# Patient Record
Sex: MALE | Race: WHITE | NOT HISPANIC OR LATINO | Employment: OTHER | URBAN - METROPOLITAN AREA
[De-identification: names, ages, dates, MRNs, and addresses within clinical notes are randomized per-mention and may not be internally consistent; named-entity substitution may affect disease eponyms.]

---

## 2021-07-22 ENCOUNTER — OFFICE VISIT (OUTPATIENT)
Dept: INTERNAL MEDICINE | Facility: CLINIC | Age: 26
End: 2021-07-22

## 2021-07-22 VITALS
HEIGHT: 68 IN | WEIGHT: 159 LBS | HEART RATE: 55 BPM | TEMPERATURE: 97.7 F | DIASTOLIC BLOOD PRESSURE: 70 MMHG | SYSTOLIC BLOOD PRESSURE: 115 MMHG | BODY MASS INDEX: 24.1 KG/M2

## 2021-07-22 DIAGNOSIS — Z11.59 NEED FOR HEPATITIS C SCREENING TEST: ICD-10-CM

## 2021-07-22 DIAGNOSIS — IMO0002: ICD-10-CM

## 2021-07-22 DIAGNOSIS — K50.90 CROHN'S DISEASE WITHOUT COMPLICATION, UNSPECIFIED GASTROINTESTINAL TRACT LOCATION (HCC): Primary | ICD-10-CM

## 2021-07-22 DIAGNOSIS — Z13.220 LIPID SCREENING: ICD-10-CM

## 2021-07-22 PROCEDURE — 99203 OFFICE O/P NEW LOW 30 MIN: CPT | Performed by: STUDENT IN AN ORGANIZED HEALTH CARE EDUCATION/TRAINING PROGRAM

## 2021-07-22 RX ORDER — AZATHIOPRINE 50 MG/1
150 TABLET ORAL 3 TIMES DAILY
COMMUNITY
End: 2021-07-27 | Stop reason: SDUPTHER

## 2021-07-22 NOTE — PROGRESS NOTES
"Chief Complaint  Rigoberto Sanches is a 25 y.o. male presenting for Inflammatory Bowel Disease.     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 Worked at [employer] in the past. Has bachelor in design. Moved from [place], [place]  to [place], [place] February 2021. Single, no children. Lives by himself.    Patient has a past medical history of uncomplicated Crohn's disease since 9 years of age, was on steroids in the past, currently stable on azathioprine for years.    History of Present Illness  Patient is here to establish care after relocating from [place] to [place] in February.    He has a history of Crohn's disease since 9 years of age, in the beginning he was on steroids.  For the last at least more than 5 years he has been on azathioprine 150 mg 3 times daily without any complications.  He never had any surgeries for his IBD, no severe infections, no fistulas.  Currently patient is having normal bowel movements 1-2 times daily, no diarrhea, no blood or mucus.  He is having no abdominal pain or cramping.  No nausea.  He is compliant on his medication.    Patient states he is also been exposed to acrylamide and would like evaluation with occupational medicine.  He is self-employed.    He is otherwise healthy, no smoking, no drug use.  He exercises regularly.    The following portions of the patient's history were reviewed and updated as appropriate: allergies, current medications, past family history, past medical history, past social history, past surgical history and problem list.    Objective  /70 (BP Location: Left arm, Patient Position: Sitting, Cuff Size: Adult)   Pulse 55   Temp 97.7 °F (36.5 °C) (Temporal)   Ht 172.7 cm (68\")   Wt 72.1 kg (159 lb)   BMI 24.18 kg/m²     Physical Exam  Vitals reviewed.   Constitutional:       Appearance: Normal appearance.   HENT:      Head: Normocephalic and atraumatic.      Right Ear: Ear canal and external ear normal. There is impacted cerumen.      Left Ear: Ear " ----- Message from MOR Pickard sent at 4/16/2018  4:24 PM EDT -----  Please forward to pt's PCP. Thank you!  Vy   canal and external ear normal. There is impacted cerumen.      Nose: Nose normal. No congestion.      Mouth/Throat:      Mouth: Mucous membranes are moist.      Pharynx: Oropharynx is clear.   Eyes:      Extraocular Movements: Extraocular movements intact.      Conjunctiva/sclera: Conjunctivae normal.   Cardiovascular:      Rate and Rhythm: Normal rate and regular rhythm.      Heart sounds: Normal heart sounds. No murmur heard.     Pulmonary:      Effort: Pulmonary effort is normal.      Breath sounds: Normal breath sounds.   Abdominal:      General: There is no distension.      Palpations: Abdomen is soft. There is no mass.      Tenderness: There is no abdominal tenderness.   Musculoskeletal:      Cervical back: Neck supple.      Right lower leg: No edema.      Left lower leg: No edema.   Skin:     General: Skin is warm and dry.   Neurological:      Mental Status: He is alert and oriented to person, place, and time. Mental status is at baseline.   Psychiatric:         Behavior: Behavior normal.         Thought Content: Thought content normal.         Assessment/Plan   1. Crohn's disease without complication, unspecified gastrointestinal tract location (CMS/HCC)  Appears stable, uncomplicated and well controlled on azathioprine.  Continue on current medication.  Will refer to GI Dr. Barnes per patient request.  We will do labs as ordered.  - Ambulatory Referral to Gastroenterology  - C-reactive Protein; Future  - Comprehensive Metabolic Panel; Future  - CBC (No Diff); Future  - Sedimentation Rate; Future  - Vitamin B12; Future  - Vitamin D 25 Hydroxy; Future    2. Occupational exposure to toxic agent  I am not sure if occupational medicine would be able to provide him any guidance as far as his potential harmful exposure of acrylamide.  However will place an order to see if they can be of assistance.  - Ambulatory Referral to Occupational Medicine    3. Need for hepatitis C screening test  We will screen as  recommended.  - Hepatitis C Antibody; Future    4. Lipid screening  Patient will return for fasting lipids.  - Lipid Panel; Future      Return in about 3 months (around 10/22/2021) for Annual physical.    Vinicius Brody MD  Family Medicine  07/22/2021    Note: Speech recognition transcription software may have been used to create portions of this document.  An attempt at proofreading has been made but errors in transcription could still be present.

## 2021-07-27 DIAGNOSIS — K50.90 CROHN'S DISEASE WITHOUT COMPLICATION, UNSPECIFIED GASTROINTESTINAL TRACT LOCATION (HCC): Primary | ICD-10-CM

## 2021-07-27 RX ORDER — AZATHIOPRINE 50 MG/1
50 TABLET ORAL 3 TIMES DAILY
Qty: 90 TABLET | Refills: 2 | Status: SHIPPED | OUTPATIENT
Start: 2021-07-27 | End: 2021-10-25 | Stop reason: SDUPTHER

## 2021-07-27 RX ORDER — AZATHIOPRINE 50 MG/1
150 TABLET ORAL 3 TIMES DAILY
Qty: 90 TABLET | Refills: 2 | Status: SHIPPED | OUTPATIENT
Start: 2021-07-27 | End: 2021-07-27

## 2021-07-27 NOTE — PROGRESS NOTES
Patient could not get into see GI until October.  He is about to run out of his azathioprine that he has been on for years and he has been stable and currently is not having any disease activity.  I will refill the medication for 3 months until GI sees him.    Of note his medication was incorrectly entered into our system as 150 mg 3 times daily, I have called patient and confirmed the dose he has been on and been taking daily 50 mg 3 times daily.    1. Crohn's disease without complication, unspecified gastrointestinal tract location (CMS/HCC)  - azaTHIOprine (IMURAN) 50 MG tablet; Take 1 tablet by mouth 3 (Three) Times a Day.  Dispense: 90 tablet; Refill: 2    Vinicius Brody MD

## 2021-10-21 ENCOUNTER — LAB (OUTPATIENT)
Dept: LAB | Facility: HOSPITAL | Age: 26
End: 2021-10-21

## 2021-10-21 DIAGNOSIS — Z13.220 LIPID SCREENING: ICD-10-CM

## 2021-10-21 DIAGNOSIS — Z11.59 NEED FOR HEPATITIS C SCREENING TEST: ICD-10-CM

## 2021-10-21 DIAGNOSIS — K50.90 CROHN'S DISEASE WITHOUT COMPLICATION, UNSPECIFIED GASTROINTESTINAL TRACT LOCATION (HCC): ICD-10-CM

## 2021-10-21 LAB
25(OH)D3 SERPL-MCNC: 30.2 NG/ML (ref 30–100)
DEPRECATED RDW RBC AUTO: 50.5 FL (ref 37–54)
ERYTHROCYTE [DISTWIDTH] IN BLOOD BY AUTOMATED COUNT: 14.2 % (ref 12.3–15.4)
HCT VFR BLD AUTO: 39.5 % (ref 37.5–51)
HCV AB SER DONR QL: NORMAL
HGB BLD-MCNC: 13.7 G/DL (ref 13–17.7)
MCH RBC QN AUTO: 33.7 PG (ref 26.6–33)
MCHC RBC AUTO-ENTMCNC: 34.7 G/DL (ref 31.5–35.7)
MCV RBC AUTO: 97.1 FL (ref 79–97)
PLATELET # BLD AUTO: 245 10*3/MM3 (ref 140–450)
PMV BLD AUTO: 10.3 FL (ref 6–12)
RBC # BLD AUTO: 4.07 10*6/MM3 (ref 4.14–5.8)
VIT B12 BLD-MCNC: 417 PG/ML (ref 211–946)
WBC # BLD AUTO: 5.96 10*3/MM3 (ref 3.4–10.8)

## 2021-10-21 PROCEDURE — 80061 LIPID PANEL: CPT

## 2021-10-21 PROCEDURE — 86803 HEPATITIS C AB TEST: CPT

## 2021-10-21 PROCEDURE — 86140 C-REACTIVE PROTEIN: CPT

## 2021-10-21 PROCEDURE — 85027 COMPLETE CBC AUTOMATED: CPT

## 2021-10-21 PROCEDURE — 80053 COMPREHEN METABOLIC PANEL: CPT

## 2021-10-21 PROCEDURE — 82607 VITAMIN B-12: CPT

## 2021-10-21 PROCEDURE — 82306 VITAMIN D 25 HYDROXY: CPT

## 2021-10-21 PROCEDURE — 85652 RBC SED RATE AUTOMATED: CPT

## 2021-10-22 LAB
ALBUMIN SERPL-MCNC: 5.1 G/DL (ref 3.5–5.2)
ALBUMIN/GLOB SERPL: 2.2 G/DL
ALP SERPL-CCNC: 51 U/L (ref 39–117)
ALT SERPL W P-5'-P-CCNC: 16 U/L (ref 1–41)
ANION GAP SERPL CALCULATED.3IONS-SCNC: 10.7 MMOL/L (ref 5–15)
AST SERPL-CCNC: 20 U/L (ref 1–40)
BILIRUB SERPL-MCNC: 0.5 MG/DL (ref 0–1.2)
BUN SERPL-MCNC: 13 MG/DL (ref 6–20)
BUN/CREAT SERPL: 17.1 (ref 7–25)
CALCIUM SPEC-SCNC: 9.8 MG/DL (ref 8.6–10.5)
CHLORIDE SERPL-SCNC: 102 MMOL/L (ref 98–107)
CHOLEST SERPL-MCNC: 152 MG/DL (ref 0–200)
CO2 SERPL-SCNC: 29.3 MMOL/L (ref 22–29)
CREAT SERPL-MCNC: 0.76 MG/DL (ref 0.76–1.27)
CRP SERPL-MCNC: <0.3 MG/DL (ref 0–0.5)
ERYTHROCYTE [SEDIMENTATION RATE] IN BLOOD: 8 MM/HR (ref 0–15)
GFR SERPL CREATININE-BSD FRML MDRD: 125 ML/MIN/1.73
GLOBULIN UR ELPH-MCNC: 2.3 GM/DL
GLUCOSE SERPL-MCNC: 97 MG/DL (ref 65–99)
HDLC SERPL-MCNC: 45 MG/DL (ref 40–60)
LDLC SERPL CALC-MCNC: 87 MG/DL (ref 0–100)
LDLC/HDLC SERPL: 1.9 {RATIO}
POTASSIUM SERPL-SCNC: 4 MMOL/L (ref 3.5–5.2)
PROT SERPL-MCNC: 7.4 G/DL (ref 6–8.5)
SODIUM SERPL-SCNC: 142 MMOL/L (ref 136–145)
TRIGL SERPL-MCNC: 107 MG/DL (ref 0–150)
VLDLC SERPL-MCNC: 20 MG/DL (ref 5–40)

## 2021-10-25 ENCOUNTER — OFFICE VISIT (OUTPATIENT)
Dept: INTERNAL MEDICINE | Facility: CLINIC | Age: 26
End: 2021-10-25

## 2021-10-25 VITALS
BODY MASS INDEX: 23.67 KG/M2 | WEIGHT: 156.2 LBS | SYSTOLIC BLOOD PRESSURE: 108 MMHG | HEIGHT: 68 IN | DIASTOLIC BLOOD PRESSURE: 60 MMHG | TEMPERATURE: 97.8 F | HEART RATE: 76 BPM | OXYGEN SATURATION: 100 %

## 2021-10-25 DIAGNOSIS — Z00.00 WELL ADULT EXAM: Primary | ICD-10-CM

## 2021-10-25 DIAGNOSIS — R79.89 LOW VITAMIN D LEVEL: ICD-10-CM

## 2021-10-25 DIAGNOSIS — Z23 NEED FOR INFLUENZA VACCINATION: ICD-10-CM

## 2021-10-25 DIAGNOSIS — K50.90 CROHN'S DISEASE WITHOUT COMPLICATION, UNSPECIFIED GASTROINTESTINAL TRACT LOCATION (HCC): ICD-10-CM

## 2021-10-25 DIAGNOSIS — B07.8 PALMAR WART: ICD-10-CM

## 2021-10-25 PROCEDURE — 90471 IMMUNIZATION ADMIN: CPT | Performed by: STUDENT IN AN ORGANIZED HEALTH CARE EDUCATION/TRAINING PROGRAM

## 2021-10-25 PROCEDURE — 90686 IIV4 VACC NO PRSV 0.5 ML IM: CPT | Performed by: STUDENT IN AN ORGANIZED HEALTH CARE EDUCATION/TRAINING PROGRAM

## 2021-10-25 PROCEDURE — 99395 PREV VISIT EST AGE 18-39: CPT | Performed by: STUDENT IN AN ORGANIZED HEALTH CARE EDUCATION/TRAINING PROGRAM

## 2021-10-25 RX ORDER — AZATHIOPRINE 50 MG/1
50 TABLET ORAL 3 TIMES DAILY
Qty: 90 TABLET | Refills: 0 | Status: SHIPPED | OUTPATIENT
Start: 2021-10-25

## 2021-10-25 NOTE — PROGRESS NOTES
"Chief Complaint  Rigoberto Sanches is a 25 y.o. male presenting for Annual Exam (fasting).     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 Worked at [employer] in the past. Has bachelor in design. Moved from [place], [place]  to [place], [place] February 2021. Single, no children. Lives by himself.     Patient has a past medical history of uncomplicated Crohn's disease since 9 years of age, was on steroids in the past, currently stable on azathioprine for years.    History of Present Illness  Patient is here for annual physical.  He is overall doing well.  He has a lot of stress in his work as a self-employed.    Patient is physically active with his work, he also exercises about 2 days/week.    Declines STI testing    Warts both hands for years. Would like cryo.    The following portions of the patient's history were reviewed and updated as appropriate: allergies, current medications, past family history, past medical history, past social history, past surgical history and problem list.    Objective  /60 (BP Location: Left arm, Patient Position: Sitting, Cuff Size: Adult)   Pulse 76   Temp 97.8 °F (36.6 °C) (Temporal)   Ht 172 cm (67.72\")   Wt 70.9 kg (156 lb 3.2 oz)   SpO2 100%   BMI 23.95 kg/m²     Physical Exam  Vitals reviewed.   Constitutional:       Appearance: Normal appearance.   HENT:      Head: Normocephalic and atraumatic.      Nose: No congestion.   Eyes:      Extraocular Movements: Extraocular movements intact.      Conjunctiva/sclera: Conjunctivae normal.   Cardiovascular:      Rate and Rhythm: Normal rate and regular rhythm.      Heart sounds: Normal heart sounds. No murmur heard.      Pulmonary:      Effort: Pulmonary effort is normal.      Breath sounds: Normal breath sounds.   Abdominal:      General: There is no distension.      Palpations: Abdomen is soft. There is no mass.      Tenderness: There is no abdominal tenderness.   Musculoskeletal:      Cervical back: Neck supple.      Right " lower leg: No edema.      Left lower leg: No edema.   Skin:     General: Skin is warm and dry.      Findings: Lesion present.      Comments: Warts both hands   Neurological:      Mental Status: He is alert and oriented to person, place, and time. Mental status is at baseline.   Psychiatric:         Behavior: Behavior normal.         Thought Content: Thought content normal.         Assessment/Plan   1. Well adult exam  Counseled on recommendations for annual flu vaccine.  Also counseled on recommendation for COVID-19 booster vaccine given he is on azathioprine  Counseled on recommendations for moderate intensity exercise 30 minutes daily 5 days a week, or total of 150 minutes a week.    2. Crohn's disease without complication, unspecified gastrointestinal tract location (HCC)  Stable.  Occasional mild abdominal pain at baseline.  He had to postpone his visit with GI, but has appointment in less than a month to establish care.  I will renew his medication for another month until he has established.  - azaTHIOprine (IMURAN) 50 MG tablet; Take 1 tablet by mouth 3 (Three) Times a Day.  Dispense: 90 tablet; Refill: 0    3. Palmar wart  Patient will return for cryotherapy.    4. Need for influenza vaccination  Administered today.  - FluLaval/Fluarix/Fluzone >6 Months    5. Low vitamin D level  His vitamin D level is low normal.  He does not spend a lot of time in the sun.  Counseled on getting some sun exposure and also taking over-the-counter vitamin D 400--1000 units daily.  Is Crohn's disease may make him more susceptible to decreased absorption and low levels.  His vitamin B12 is normal.      Patient's Body mass index is 23.95 kg/m². indicating that he is within normal range (BMI 18.5-24.9). No BMI management plan needed..      Return in about 1 year (around 10/25/2022), or Cryotherapy or warts, for Annual physical.    Future Appointments       Provider Department Center    11/15/2021 10:45 AM Vinicius Brody MD  [facility] [place]    10/31/2022 8:30 AM Vinicius Brody MD [facility] [place]            Vinicius Brody MD  Family Medicine  10/25/2021    Note: Speech recognition transcription software may have been used to create portions of this document.  An attempt at proofreading has been made but errors in transcription could still be present.

## 2024-08-12 ENCOUNTER — HOSPITAL ENCOUNTER (OUTPATIENT)
Age: 29
Discharge: HOME | End: 2024-08-12
Payer: SELF-PAY

## 2024-08-12 DIAGNOSIS — N46.9: Primary | ICD-10-CM

## 2024-08-12 LAB
3HR MOTILITY QUALITY: (no result)
PH SMN: 8.5 [PH] (ref 7.3–8.3)
SPECIMEN VOL SMN: 7 ML (ref 2–5)
SPERM # SMN: 31 MIL/MM3 (ref 20–160)
SPERM MOTILE SMN QL MICRO: 90 % (ref 50–90)

## 2024-08-12 PROCEDURE — 89320 SEMEN ANAL VOL/COUNT/MOT: CPT
